# Patient Record
Sex: MALE | Race: WHITE | NOT HISPANIC OR LATINO | Employment: FULL TIME | URBAN - NONMETROPOLITAN AREA
[De-identification: names, ages, dates, MRNs, and addresses within clinical notes are randomized per-mention and may not be internally consistent; named-entity substitution may affect disease eponyms.]

---

## 2021-01-25 ENCOUNTER — OFFICE VISIT (OUTPATIENT)
Dept: OCCUPATIONAL MEDICINE | Age: 31
End: 2021-01-25

## 2021-01-25 DIAGNOSIS — Z02.89 ENCOUNTER FOR OCCUPATIONAL HEALTH EXAMINATION: Primary | ICD-10-CM

## 2021-01-25 PROCEDURE — OH035 DOT/N-DOT DS COLLECTION ONLY (ALL TYPES): Performed by: PREVENTIVE MEDICINE

## 2021-04-17 ENCOUNTER — HISTORICAL (OUTPATIENT)
Dept: ADMINISTRATIVE | Facility: HOSPITAL | Age: 31
End: 2021-04-17

## 2021-04-17 LAB
ERYTHROCYTE [SEDIMENTATION RATE] IN BLOOD: 1 MM/HR (ref 0–15)
URATE SERPL-MCNC: 10.1 MG/DL (ref 3.5–7.2)

## 2021-07-20 ENCOUNTER — HISTORICAL (OUTPATIENT)
Dept: ADMINISTRATIVE | Facility: HOSPITAL | Age: 31
End: 2021-07-20

## 2021-07-20 LAB — SARS-COV-2 RNA RESP QL NAA+PROBE: NEGATIVE

## 2021-07-24 ENCOUNTER — HISTORICAL (OUTPATIENT)
Dept: ADMINISTRATIVE | Facility: HOSPITAL | Age: 31
End: 2021-07-24

## 2021-07-24 LAB — SARS-COV-2 RNA RESP QL NAA+PROBE: NEGATIVE

## 2022-02-15 ENCOUNTER — HISTORICAL (OUTPATIENT)
Dept: ADMINISTRATIVE | Facility: HOSPITAL | Age: 32
End: 2022-02-15

## 2022-04-11 ENCOUNTER — HISTORICAL (OUTPATIENT)
Dept: ADMINISTRATIVE | Facility: HOSPITAL | Age: 32
End: 2022-04-11

## 2022-04-27 VITALS
DIASTOLIC BLOOD PRESSURE: 90 MMHG | SYSTOLIC BLOOD PRESSURE: 137 MMHG | HEIGHT: 70 IN | WEIGHT: 279.75 LBS | BODY MASS INDEX: 40.05 KG/M2 | OXYGEN SATURATION: 97 %

## 2022-05-04 NOTE — HISTORICAL [facility]ER
This is a historical note converted from Norbert. Formatting and pictures may have been removed.  Please reference Norbert for original formatting and attached multimedia. Chief Complaint  Awoke yesterday with Sharp pain radiating across right knee. ?Denies recent injury or treauma  History of Present Illness  31-year-old male who presents for evaluation of right knee pain. ?He states that the pain began upon awakening yesterday. ?He states that the pain?was sudden and sharp?and radiates?upward in the mid patella.? Patient does admit to history of gout?however states that?his acute gout flares are usually?been present in the toe.? He denies any trauma. ?The patient does admit to?warmth, erythema and swelling?of the right knee.  Review of Systems  Constitutional_no fever, fatigue, or weakness  Musculoskeletal_negative except HPI  Integumentary_no skin rash or abnormal lesion  Neurologic_no weakness or numbness  ?  Physical Exam  Vitals & Measurements  T:?36.9? ?C (Tympanic)? HR:?96(Peripheral)? BP:?136/93? SpO2:?98%?  HT:?177.80?cm? WT:?117.100?kg? BMI:?37.04?  General_alert and oriented, no acute distress  Respiratory_symmetric chest rise, nonlabored patient  Musculoskeletal_right lower extremity:?Tenderness?at the inferior medial and inferior lateral?edge of the patella,?tenderness in the central portion of the patella,?pain on patellar compression,?erythema?of the right knee,?warmth,?normal extension, decreased flexion due to pain,?patient is able to bear weight.? Left lower extremity: No erythema, no swelling, normal range of motion, no?peripatellar tenderness  Integumentary_no visible skin rashes  Assessment/Plan  Right knee pain?M25.561  ?XR Right knee: No acute abnormality, my read, radiologist read pending.  Patient will be notified of official report.  Uric acid and Sed rate ordered and pending  Celestone 12mg IM given in clinic  Start Indomethacin 50mg every 8hr as needed for pain. May alternate with  Tylenol  May start (in 24h)?prednisone 20 mg daily x3 days if swelling and pain persist.  Recommend ice and elevation.  Monitor for worsening symptoms and contact clinic if any concerns arise.  Ordered:  betamethasone, 12 mg, IM, Once-Unscheduled, first dose 04/17/21 8:58:00 CDT  indomethacin, 50 mg = 1 cap(s), Oral, TID, PRN PRN for gout pain, # 30 cap(s), 0 Refill(s), Pharmacy: [id number], 177.8, cm, Height/Length Dosing, 04/17/21 8:41:00 CDT, 117.1, kg, Weight Dosing, 04/17/21 8:41:00 CDT  Sedimentation Rate, Routine collect, 04/17/21 8:58:00 CDT, Blood, Order for future visit, Stop date 04/17/21 8:58:00 CDT, Lab Collect, No Charge, Right knee pain, 04/17/21 8:58:00 CDT  Uric Acid, Routine collect, 04/17/21 8:58:00 CDT, Blood, Order for future visit, Stop date 04/17/21 8:58:00 CDT, Lab Collect, No Charge, Right knee pain, 04/17/21 8:58:00 CDT  XR Knee Right 1 or 2 Views, Routine, 04/17/21 8:57:00 CDT, None, Ambulatory, Rad Type, Right knee pain, Not Scheduled, 04/17/21 8:57:00 CDT  ?  Orders:  predniSONE, 20 mg = 1 tab(s), Oral, Daily, X 3 day(s), # 3 tab(s), 0 Refill(s), Pharmacy: [id number], 177.8, cm, Height/Length Dosing, 04/17/21 8:41:00 CDT, 117.1, kg, Weight Dosing, 04/17/21 8:41:00 CDT  Side effects of cortisone can include:  -jitteriness, insomnia, flushing, palpitations, gastritis/heartburn, and elevated blood sugar in diabetic patients. ?Use of corticosteroids may increase risk of jing COVID-19. Recommend in home quarantine while taking corticosteroids.  Typically lasts approximately 12-24 hours for most cortisone injections and vary with the type and dose of cortisone given   Problem List/Past Medical History  Ongoing  Anxiety  Morbid obesity  Historical  No qualifying data  Medications  busPIRone 5 mg oral tablet, 5 mg= 1 tab(s), Oral, BID  Celestone Soluspan, 12 mg, IM, Once-Unscheduled  indomethacin 50 mg oral capsule, 50 mg= 1 cap(s), Oral, TID, PRN  prednisONE 20 mg  oral tablet, 20 mg= 1 tab(s), Oral, Daily  venlafaxine 37.5 mg oral capsule, extended release  Allergies  No Known Medication Allergies  Social History  Abuse/Neglect  No, 04/17/2021  Alcohol  Never, 04/17/2021  Substance Use  Never, 04/17/2021  Tobacco  Never (less than 100 in lifetime), N/A, 04/17/2021  Family History  Diabetes mellitus: Mother and Father.  Hypertension.: Sister and Brother.

## 2024-08-26 ENCOUNTER — TELEPHONE (OUTPATIENT)
Dept: SLEEP MEDICINE | Facility: HOSPITAL | Age: 34
End: 2024-08-26
Payer: OTHER GOVERNMENT

## 2024-08-26 DIAGNOSIS — G47.33 OBSTRUCTIVE SLEEP APNEA (ADULT) (PEDIATRIC): Primary | ICD-10-CM

## 2024-09-16 ENCOUNTER — PROCEDURE VISIT (OUTPATIENT)
Dept: SLEEP MEDICINE | Facility: HOSPITAL | Age: 34
End: 2024-09-16
Attending: PSYCHIATRY & NEUROLOGY
Payer: OTHER GOVERNMENT

## 2024-09-16 DIAGNOSIS — G47.33 OBSTRUCTIVE SLEEP APNEA (ADULT) (PEDIATRIC): ICD-10-CM

## 2024-09-16 PROCEDURE — 95811 POLYSOM 6/>YRS CPAP 4/> PARM: CPT

## 2024-09-16 PROCEDURE — 95811 POLYSOM 6/>YRS CPAP 4/> PARM: CPT | Mod: 26,,, | Performed by: PSYCHIATRY & NEUROLOGY

## 2025-01-03 ENCOUNTER — HOSPITAL ENCOUNTER (EMERGENCY)
Facility: HOSPITAL | Age: 35
Discharge: PSYCHIATRIC OR SUBSTANCE USE FACILITY | End: 2025-01-03
Attending: EMERGENCY MEDICINE
Payer: OTHER GOVERNMENT

## 2025-01-03 VITALS
WEIGHT: 308 LBS | TEMPERATURE: 98 F | OXYGEN SATURATION: 96 % | DIASTOLIC BLOOD PRESSURE: 86 MMHG | HEART RATE: 85 BPM | RESPIRATION RATE: 20 BRPM | BODY MASS INDEX: 44.09 KG/M2 | SYSTOLIC BLOOD PRESSURE: 138 MMHG | HEIGHT: 70 IN

## 2025-01-03 DIAGNOSIS — F32.A DEPRESSION, UNSPECIFIED DEPRESSION TYPE: Primary | ICD-10-CM

## 2025-01-03 DIAGNOSIS — R45.89 THOUGHTS OF HARMING OTHERS: ICD-10-CM

## 2025-01-03 DIAGNOSIS — Z00.8 MEDICAL CLEARANCE FOR PSYCHIATRIC ADMISSION: ICD-10-CM

## 2025-01-03 DIAGNOSIS — R45.851 PASSIVE SUICIDAL IDEATIONS: ICD-10-CM

## 2025-01-03 LAB
ALBUMIN SERPL-MCNC: 4.1 G/DL (ref 3.5–5)
ALBUMIN/GLOB SERPL: 1 RATIO (ref 1.1–2)
ALP SERPL-CCNC: 77 UNIT/L (ref 40–150)
ALT SERPL-CCNC: 67 UNIT/L (ref 0–55)
AMPHET UR QL SCN: NEGATIVE
ANION GAP SERPL CALC-SCNC: 10 MEQ/L
APAP SERPL-MCNC: <3 UG/ML (ref 10–30)
AST SERPL-CCNC: 57 UNIT/L (ref 5–34)
BACTERIA #/AREA URNS AUTO: ABNORMAL /HPF
BARBITURATE SCN PRESENT UR: NEGATIVE
BASOPHILS # BLD AUTO: 0.12 X10(3)/MCL
BASOPHILS NFR BLD AUTO: 1.2 %
BENZODIAZ UR QL SCN: NEGATIVE
BILIRUB SERPL-MCNC: 1.2 MG/DL
BILIRUB UR QL STRIP.AUTO: NEGATIVE
BUN SERPL-MCNC: 13.9 MG/DL (ref 8.9–20.6)
CALCIUM SERPL-MCNC: 9.3 MG/DL (ref 8.4–10.2)
CANNABINOIDS UR QL SCN: NEGATIVE
CHLORIDE SERPL-SCNC: 102 MMOL/L (ref 98–107)
CK SERPL-CCNC: 1361 U/L (ref 30–200)
CK SERPL-CCNC: 978 U/L (ref 30–200)
CLARITY UR: CLEAR
CO2 SERPL-SCNC: 25 MMOL/L (ref 22–29)
COCAINE UR QL SCN: NEGATIVE
COLOR UR AUTO: COLORLESS
CREAT SERPL-MCNC: 1.11 MG/DL (ref 0.72–1.25)
CREAT/UREA NIT SERPL: 13
EOSINOPHIL # BLD AUTO: 0.72 X10(3)/MCL (ref 0–0.9)
EOSINOPHIL NFR BLD AUTO: 7 %
ERYTHROCYTE [DISTWIDTH] IN BLOOD BY AUTOMATED COUNT: 12.4 % (ref 11.5–17)
ETHANOL SERPL-MCNC: <10 MG/DL
FENTANYL UR QL SCN: NEGATIVE
GFR SERPLBLD CREATININE-BSD FMLA CKD-EPI: >60 ML/MIN/1.73/M2
GLOBULIN SER-MCNC: 4.1 GM/DL (ref 2.4–3.5)
GLUCOSE SERPL-MCNC: 87 MG/DL (ref 74–100)
GLUCOSE UR QL STRIP: NORMAL
HCT VFR BLD AUTO: 43.7 % (ref 42–52)
HGB BLD-MCNC: 15.3 G/DL (ref 14–18)
HGB UR QL STRIP: NEGATIVE
IMM GRANULOCYTES # BLD AUTO: 0.05 X10(3)/MCL (ref 0–0.04)
IMM GRANULOCYTES NFR BLD AUTO: 0.5 %
KETONES UR QL STRIP: NEGATIVE
LEUKOCYTE ESTERASE UR QL STRIP: NEGATIVE
LYMPHOCYTES # BLD AUTO: 2.75 X10(3)/MCL (ref 0.6–4.6)
LYMPHOCYTES NFR BLD AUTO: 26.8 %
MCH RBC QN AUTO: 31.6 PG (ref 27–31)
MCHC RBC AUTO-ENTMCNC: 35 G/DL (ref 33–36)
MCV RBC AUTO: 90.3 FL (ref 80–94)
MDMA UR QL SCN: NEGATIVE
MONOCYTES # BLD AUTO: 1 X10(3)/MCL (ref 0.1–1.3)
MONOCYTES NFR BLD AUTO: 9.7 %
MUCOUS THREADS URNS QL MICRO: ABNORMAL /LPF
NEUTROPHILS # BLD AUTO: 5.62 X10(3)/MCL (ref 2.1–9.2)
NEUTROPHILS NFR BLD AUTO: 54.8 %
NITRITE UR QL STRIP: NEGATIVE
NRBC BLD AUTO-RTO: 0 %
OHS QRS DURATION: 82 MS
OHS QTC CALCULATION: 448 MS
OPIATES UR QL SCN: NEGATIVE
PCP UR QL: NEGATIVE
PH UR STRIP: 7 [PH]
PH UR: 7 [PH] (ref 3–11)
PLATELET # BLD AUTO: 274 X10(3)/MCL (ref 130–400)
PMV BLD AUTO: 10.8 FL (ref 7.4–10.4)
POTASSIUM SERPL-SCNC: 4.7 MMOL/L (ref 3.5–5.1)
PROT SERPL-MCNC: 8.2 GM/DL (ref 6.4–8.3)
PROT UR QL STRIP: NEGATIVE
RBC # BLD AUTO: 4.84 X10(6)/MCL (ref 4.7–6.1)
RBC #/AREA URNS AUTO: ABNORMAL /HPF
SARS-COV-2 RDRP RESP QL NAA+PROBE: NEGATIVE
SODIUM SERPL-SCNC: 137 MMOL/L (ref 136–145)
SP GR UR STRIP.AUTO: 1 (ref 1–1.03)
SPECIFIC GRAVITY, URINE AUTO (.000) (OHS): 1 (ref 1–1.03)
SQUAMOUS #/AREA URNS LPF: ABNORMAL /HPF
TSH SERPL-ACNC: 1.66 UIU/ML (ref 0.35–4.94)
UROBILINOGEN UR STRIP-ACNC: NORMAL
WBC # BLD AUTO: 10.26 X10(3)/MCL (ref 4.5–11.5)
WBC #/AREA URNS AUTO: ABNORMAL /HPF

## 2025-01-03 PROCEDURE — 80143 DRUG ASSAY ACETAMINOPHEN: CPT | Performed by: EMERGENCY MEDICINE

## 2025-01-03 PROCEDURE — 80053 COMPREHEN METABOLIC PANEL: CPT | Performed by: EMERGENCY MEDICINE

## 2025-01-03 PROCEDURE — U0002 COVID-19 LAB TEST NON-CDC: HCPCS | Performed by: EMERGENCY MEDICINE

## 2025-01-03 PROCEDURE — 99285 EMERGENCY DEPT VISIT HI MDM: CPT | Mod: 25

## 2025-01-03 PROCEDURE — 81001 URINALYSIS AUTO W/SCOPE: CPT | Performed by: EMERGENCY MEDICINE

## 2025-01-03 PROCEDURE — 84443 ASSAY THYROID STIM HORMONE: CPT | Performed by: EMERGENCY MEDICINE

## 2025-01-03 PROCEDURE — 93005 ELECTROCARDIOGRAM TRACING: CPT

## 2025-01-03 PROCEDURE — 80307 DRUG TEST PRSMV CHEM ANLYZR: CPT | Performed by: EMERGENCY MEDICINE

## 2025-01-03 PROCEDURE — 85025 COMPLETE CBC W/AUTO DIFF WBC: CPT | Performed by: EMERGENCY MEDICINE

## 2025-01-03 PROCEDURE — 25000003 PHARM REV CODE 250: Performed by: EMERGENCY MEDICINE

## 2025-01-03 PROCEDURE — 63600175 PHARM REV CODE 636 W HCPCS: Performed by: EMERGENCY MEDICINE

## 2025-01-03 PROCEDURE — 82077 ASSAY SPEC XCP UR&BREATH IA: CPT | Performed by: EMERGENCY MEDICINE

## 2025-01-03 PROCEDURE — 93010 ELECTROCARDIOGRAM REPORT: CPT | Mod: ,,, | Performed by: INTERNAL MEDICINE

## 2025-01-03 PROCEDURE — 82550 ASSAY OF CK (CPK): CPT | Performed by: EMERGENCY MEDICINE

## 2025-01-03 RX ORDER — IBUPROFEN 600 MG/1
600 TABLET ORAL
Status: COMPLETED | OUTPATIENT
Start: 2025-01-03 | End: 2025-01-03

## 2025-01-03 RX ADMIN — IBUPROFEN 600 MG: 600 TABLET, FILM COATED ORAL at 01:01

## 2025-01-03 RX ADMIN — SODIUM CHLORIDE, POTASSIUM CHLORIDE, SODIUM LACTATE AND CALCIUM CHLORIDE 2000 ML: 600; 310; 30; 20 INJECTION, SOLUTION INTRAVENOUS at 02:01

## 2025-01-03 NOTE — ED PROVIDER NOTES
"Encounter Date: 1/3/2025       History     Chief Complaint   Patient presents with    Suicidal     [facility] for [facility]. Patient reports no plan, just "not wanting to be here". PEC in place. Denies HI and SI. Reports being angry and doesn't know why.     34-year-old male presents to the emergency department on pec filed by [facility] for thoughts of harming himself and others.  Please see scanned document.  Patient states that he has ambivalence to whether he continues to exist; however, denies any specific plan of harming himself.  States that he did make a comment that he has thoughts of hurting someone so that someone else would feel how he feels.  Reports chronic low back pain, states usually takes ibuprofen for pain.      The history is provided by the patient, medical records and the EMS personnel.     Review of patient's allergies indicates:  No Known Allergies  No past medical history on file.  No past surgical history on file.  No family history on file.     Review of Systems   Constitutional:  Negative for fever.   Respiratory:  Negative for shortness of breath.    Musculoskeletal:  Positive for back pain (Chronic back pain).   Skin:  Negative for wound.   Neurological:  Negative for weakness and numbness.       Physical Exam     Initial Vitals [01/03/25 1029]   BP Pulse Resp Temp SpO2   (!) 150/92 86 19 98.2 °F (36.8 °C) 99 %      MAP       --         Physical Exam    Nursing note and vitals reviewed.  Constitutional: He appears well-developed and well-nourished. No distress.   HENT:   Head: Normocephalic and atraumatic. Mouth/Throat: Oropharynx is clear and moist.   Eyes: No scleral icterus.   Neck: Neck supple.   Cardiovascular:  Normal rate and regular rhythm.           Pulmonary/Chest: Breath sounds normal. No respiratory distress.   Abdominal: Abdomen is soft. He exhibits no distension. There is no abdominal tenderness.   Musculoskeletal:      " Cervical back: Neck supple.     Neurological: He is alert. GCS score is 15. GCS eye subscore is 4. GCS verbal subscore is 5. GCS motor subscore is 6.   Skin: Skin is warm and dry.   Psychiatric: He exhibits a depressed mood.   No active homicidal or suicidal ideation         ED Course   Procedures  Labs Reviewed   COMPREHENSIVE METABOLIC PANEL - Abnormal       Result Value    Sodium 137      Potassium 4.7      Chloride 102      CO2 25      Glucose 87      Blood Urea Nitrogen 13.9      Creatinine 1.11      Calcium 9.3      Protein Total 8.2      Albumin 4.1      Globulin 4.1 (*)     Albumin/Globulin Ratio 1.0 (*)     Bilirubin Total 1.2      ALP 77      ALT 67 (*)     AST 57 (*)     eGFR >60      Anion Gap 10.0      BUN/Creatinine Ratio 13     URINALYSIS, REFLEX TO URINE CULTURE - Abnormal    Color, UA Colorless      Appearance, UA Clear      Specific Gravity, UA 1.005      pH, UA 7.0      Protein, UA Negative      Glucose, UA Normal      Ketones, UA Negative      Blood, UA Negative      Bilirubin, UA Negative      Urobilinogen, UA Normal      Nitrites, UA Negative      Leukocyte Esterase, UA Negative      RBC, UA 0-5      WBC, UA 0-5      Bacteria, UA Trace      Squamous Epithelial Cells, UA None Seen      Mucous, UA Trace (*)    ACETAMINOPHEN LEVEL - Abnormal    Acetaminophen Level <3.0 (*)    CBC WITH DIFFERENTIAL - Abnormal    WBC 10.26      RBC 4.84      Hgb 15.3      Hct 43.7      MCV 90.3      MCH 31.6 (*)     MCHC 35.0      RDW 12.4      Platelet 274      MPV 10.8 (*)     Neut % 54.8      Lymph % 26.8      Mono % 9.7      Eos % 7.0      Basophil % 1.2      Lymph # 2.75      Neut # 5.62      Mono # 1.00      Eos # 0.72      Baso # 0.12      IG# 0.05 (*)     IG% 0.5      NRBC% 0.0     TSH - Normal    TSH 1.663     DRUG SCREEN, URINE (BEAKER) - Normal    Amphetamines, Urine Negative      Barbiturates, Urine Negative      Benzodiazepine, Urine Negative      Cannabinoids, Urine Negative      Cocaine, Urine  Negative      Fentanyl, Urine Negative      MDMA, Urine Negative      Opiates, Urine Negative      Phencyclidine, Urine Negative      pH, Urine 7.0      Specific Gravity, Urine Auto 1.005      Narrative:     Cut off concentrations:    Amphetamines - 1000 ng/ml  Barbiturates - 200 ng/ml  Benzodiazepine - 200 ng/ml  Cannabinoids (THC) - 50 ng/ml  Cocaine - 300 ng/ml  Fentanyl - 1.0 ng/ml  MDMA - 500 ng/ml  Opiates - 300 ng/ml   Phencyclidine (PCP) - 25 ng/ml    Specimen submitted for drug analysis and tested for pH and specific gravity in order to evaluate sample integrity. Suspect tampering if specific gravity is <1.003 and/or pH is not within the range of 4.5 - 8.0  False negatives may result form substances such as bleach added to urine.  False positives may result for the presence of a substance with similar chemical structure to the drug or its metabolite.    This test provides only a PRELIMINARY analytical test result. A more specific alternate chemical method must be used in order to obtain a confirmed analytical result. Gas chromatography/mass spectrometry (GC/MS) is the preferred confirmatory method. Other chemical confirmation methods are available. Clinical consideration and professional judgement should be applied to any drug of abuse test result, particularly when preliminary positive results are used.    Positive results will be confirmed only at the physicians request. Unconfirmed screening results are to be used only for medical purposes (treatment).        ALCOHOL,MEDICAL (ETHANOL) - Normal    Ethanol Level <10.0     CBC W/ AUTO DIFFERENTIAL    Narrative:     The following orders were created for panel order CBC auto differential.  Procedure                               Abnormality         Status                     ---------                               -----------         ------                     CBC with Differential[[id number]]       Abnormal            Final result                 Please view  results for these tests on the individual orders.   CK   SARS-COV-2 RNA AMPLIFICATION, QUAL     EKG Readings: (Independently Interpreted)   Initial Reading: No STEMI. Rhythm: Normal Sinus Rhythm. Heart Rate: 82.   01/03/2025 @ 1313  Inferior Q-waves, no ST elevation or depression to suggest acute ischemia       Imaging Results    None          Medications   ibuprofen tablet 600 mg (600 mg Oral Given 1/3/25 1315)     Medical Decision Making  Problems Addressed:  Depression, unspecified depression type: chronic illness or injury  Passive suicidal ideations: acute illness or injury  Thoughts of harming others: acute illness or injury    Amount and/or Complexity of Data Reviewed  Labs: ordered.    Risk  Prescription drug management.      ED assessment:    Mr. Cortez presented on pec filed by [facility] the [facility] after expressing thoughts of harming himself and others.  States that these are passive thoughts on ED arrival; however, does feel like he would be okay if he never woke up again.  Calm and cooperative here.  Only complaining of chronic low back pain for which he usually takes ibuprofen.    Differential diagnosis (including but not limited to):   Depression, suicidal ideation, homicidal ideation, acute kidney injury, electrolyte derangements, chronic back pain    ED management:   Laboratory evaluation undertaken as part of psychiatric treatment protocol demonstrates mildly elevated LFTs which are consistent with his baseline from the last 3 years.  No other acute abnormalities that would preclude transfer for inpatient psychiatric evaluation and treatment.      Amount and/or Complexity of Data Reviewed  Independent historian: EMS   Summary of history:  [facility] from [facility] on [facility]  External data reviewed: notes from clinic visits  Summary of data reviewed:  Scanned pec as per media tab  Risk and benefits of testing: discussed   Labs: ordered and reviewed  EKG: Ordered and independently reviewed, see  above    Risk  OTC medications  Decision regarding transfer   Shared decision making     Critical Care  none    Vickie KYLE MD personally performed the history, PE, MDM, and procedures as documented above and agree with the scribe's documentation.              ED Course as of 01/03/25 1319   Fri Jan 03, 2025   1318 [facility] insisting on EKG, chest x-ray, COVID screening and CPK prior to acceptance for psychiatric treatment.  No medical indication for these laboratory studies or imaging studies at this time however will order to allow patient to proceed with needed psychiatric evaluation.  [KS]      ED Course User Index  [KS] Vickie Ross MD       Medically cleared for psychiatry placement: 1/3/2025 12:20 PM                   Clinical Impression:  Final diagnoses:  [F32.A] Depression, unspecified depression type (Primary)  [R45.851] Passive suicidal ideations  [R45.89] Thoughts of harming others  [Z00.8] Medical clearance for psychiatric admission          ED Disposition Condition    Transfer to Psych Facility Stable          ED Prescriptions    None       Follow-up Information    None          Vickie Ross MD  01/03/25 1221       Vickie Ross MD  01/03/25 1319